# Patient Record
Sex: FEMALE | Race: WHITE | NOT HISPANIC OR LATINO | Employment: FULL TIME | URBAN - METROPOLITAN AREA
[De-identification: names, ages, dates, MRNs, and addresses within clinical notes are randomized per-mention and may not be internally consistent; named-entity substitution may affect disease eponyms.]

---

## 2017-10-20 ENCOUNTER — TRANSCRIBE ORDERS (OUTPATIENT)
Dept: URGENT CARE | Facility: CLINIC | Age: 42
End: 2017-10-20

## 2017-10-20 ENCOUNTER — OFFICE VISIT (OUTPATIENT)
Dept: URGENT CARE | Facility: CLINIC | Age: 42
End: 2017-10-20
Payer: COMMERCIAL

## 2017-10-20 ENCOUNTER — APPOINTMENT (OUTPATIENT)
Dept: RADIOLOGY | Facility: CLINIC | Age: 42
End: 2017-10-20
Payer: COMMERCIAL

## 2017-10-20 DIAGNOSIS — M79.641 PAIN OF RIGHT HAND: ICD-10-CM

## 2017-10-20 PROCEDURE — 73130 X-RAY EXAM OF HAND: CPT

## 2017-10-20 PROCEDURE — 99203 OFFICE O/P NEW LOW 30 MIN: CPT

## 2017-10-24 NOTE — PROGRESS NOTES
Assessment  1  Viral illness (079 99) (B34 9)   2  Hand pain, right (749 5) (B04 209)    Discussion/Summary  Discussion Summary:   1) supportive care for cold sxsotc meds as needed for sxs control  Medication Side Effects Reviewed: Possible side effects of new medications were reviewed with the patient/guardian today  Understands and agrees with treatment plan: The treatment plan was reviewed with the patient/guardian  The patient/guardian understands and agrees with the treatment plan   Counseling Documentation With Imm: The patient, patient's family was counseled regarding instructions for management,-- patient and family education,-- importance of compliance with treatment  Chief Complaint  1  Ear Pain  Chief Complaint Free Text Note Form: Earache, sore throat and achy since Tuesday      History of Present Illness  HPI: 37yo F p/w earache, sore throat, and body aches x 4 days and pain in R 4th MCP joint x 2 weeks  Pt states she has trigger finger in that hand and has been unable to find MCP joint x 2 weeks  Pt denies fever/chills, sob/wheezing/cough, n/v/d  Hospital Based Practices Required Assessment:   Pain Assessment   the patient states they have pain  The pain is located in the ear,throat  The patient describes the pain as sharp and aching  (on a scale of 0 to 10, the patient rates the pain at 5 )   Abuse And Domestic Violence Screen    Yes, the patient is safe at home  -- The patient states no one is hurting them  Depression And Suicide Screen  No, the patient has not had thoughts of hurting themself  No, the patient has not felt depressed in the past 7 days  Review of Systems  Focused-Female:   Constitutional: No fever, no chills, feels well, no tiredness, no recent weight gain or loss  ENT: earache,-- sore throat-- and-- nasal discharge, but-- no nosebleeds-- and-- no hearing loss     Cardiovascular: no complaints of slow or fast heart rate, no chest pain, no palpitations, no leg claudication or lower extremity edema  Respiratory: no complaints of shortness of breath, no wheezing, no dyspnea on exertion, no orthopnea or PND,-- no shortness of breath,-- no cough,-- no wheezing-- and-- no shortness of breath during exertion  Breasts: no complaints of breast pain, breast lump or nipple discharge  Gastrointestinal: no complaints of abdominal pain, no constipation, no nausea or diarrhea, no vomiting, no bloody stools  Genitourinary: no complaints of dysuria, no incontinence, no pelvic pain, no dysmenorrhea, no vaginal discharge or abnormal vaginal bleeding  Musculoskeletal: arthralgias,-- myalgias-- and-- joint stiffness, but-- no joint swelling,-- no limb pain-- and-- no limb swelling  Integumentary: no complaints of skin rash or lesion, no itching or dry skin, no skin wounds  Neurological: no complaints of headache, no confusion, no numbness or tingling, no dizziness or fainting  ROS Reviewed:   ROS reviewed  Active Problems  1  Fibromyalgia (729 1) (M79 7)    Past Medical History  Active Problems And Past Medical History Reviewed: The active problems and past medical history were reviewed and updated today  Family History  Family History Reviewed: The family history was reviewed and updated today  Social History  Social History Reviewed: The social history was reviewed and is unchanged  Surgical History  Surgical History Reviewed: The surgical history was reviewed and updated today  Current Meds   1  No Reported Medications Recorded  Medication List Reviewed: The medication list was reviewed and updated today  Allergies  1   Penicillins    Vitals  Signs   Recorded: [id number] 05:26PM   Temperature: 99 2 F  Heart Rate: 60  Respiration: 18  Systolic: 534  Diastolic: 91  Height: 5 ft 5 in  Weight: 179 lb   BMI Calculated: 29 79  BSA Calculated: 1 89  O2 Saturation: 96    Physical Exam    Constitutional   General appearance: No acute distress, well appearing and well nourished  Ears, Nose, Mouth, and Throat   Otoscopic examination: Tympanic membranes translucent with normal light reflex  Canals patent without erythema  Nasal mucosa, septum, and turbinates: Abnormal   normal nasal septum,-- no intranasal masses or polyps-- and-- normal nasal turbinates  There was clear rhinorrhea from both nares  The bilateral nasal mucosa was edematous  Oropharynx: Abnormal   The posterior pharynx was erythematous  Oral mucosa was moist, but-- was normal  The palate examination showed no abnormalities  The tongue was normal  There was enlargement and erythema of both tonsils  Pulmonary   Respiratory effort: No increased work of breathing or signs of respiratory distress  Auscultation of lungs: Clear to auscultation  no rales or crackles were heard bilaterally  no rhonchi  no friction rub  no wheezing  no diminished breath sounds  Cardiovascular   Palpation of heart: Normal PMI, no thrills  Auscultation of heart: Normal rate and rhythm, normal S1 and S2, without murmurs  Abdomen   Abdomen: Non-tender, no masses  Liver and spleen: No hepatomegaly or splenomegaly  Lymphatic   Palpation of lymph nodes in neck: No lymphadenopathy  Musculoskeletal   Gait and station: Normal     Digits and nails: Normal without clubbing or cyanosis  Inspection/palpation of joints, bones, and muscles: Abnormal  -- TTP, crepitus, and laxity over R 4th MCP        Signatures   Electronically signed by : RICARDO Joe; Oct 20 2017  5:46PM EST                       (Author)    Electronically signed by : TENZIN Fields ; Oct 23 2017  4:58PM EST                       (Co-author)

## 2022-06-20 ENCOUNTER — APPOINTMENT (EMERGENCY)
Dept: RADIOLOGY | Facility: HOSPITAL | Age: 47
End: 2022-06-20
Payer: COMMERCIAL

## 2022-06-20 ENCOUNTER — HOSPITAL ENCOUNTER (EMERGENCY)
Facility: HOSPITAL | Age: 47
Discharge: HOME | End: 2022-06-20
Attending: EMERGENCY MEDICINE
Payer: COMMERCIAL

## 2022-06-20 VITALS
HEART RATE: 79 BPM | OXYGEN SATURATION: 98 % | DIASTOLIC BLOOD PRESSURE: 73 MMHG | WEIGHT: 214.73 LBS | TEMPERATURE: 98.7 F | RESPIRATION RATE: 16 BRPM | SYSTOLIC BLOOD PRESSURE: 111 MMHG

## 2022-06-20 DIAGNOSIS — R07.9 CHEST PAIN: Primary | ICD-10-CM

## 2022-06-20 DIAGNOSIS — M62.838 MUSCLE SPASM: ICD-10-CM

## 2022-06-20 LAB
2HR DELTA HS TROPONIN: 0 NG/L
ANION GAP SERPL CALCULATED.3IONS-SCNC: 7 MMOL/L (ref 4–13)
ATRIAL RATE: 86 BPM
ATRIAL RATE: 87 BPM
BASOPHILS # BLD AUTO: 0.04 THOUSANDS/ΜL (ref 0–0.1)
BASOPHILS NFR BLD AUTO: 1 % (ref 0–1)
BUN SERPL-MCNC: 10 MG/DL (ref 5–25)
CALCIUM SERPL-MCNC: 9 MG/DL (ref 8.3–10.1)
CARDIAC TROPONIN I PNL SERPL HS: 6 NG/L
CARDIAC TROPONIN I PNL SERPL HS: 6 NG/L
CHLORIDE SERPL-SCNC: 102 MMOL/L (ref 100–108)
CK MB SERPL-MCNC: 4 NG/ML (ref 0–5)
CK MB SERPL-MCNC: <1 % (ref 0–2.5)
CK SERPL-CCNC: 515 U/L (ref 26–192)
CO2 SERPL-SCNC: 29 MMOL/L (ref 21–32)
CREAT SERPL-MCNC: 0.95 MG/DL (ref 0.6–1.3)
EOSINOPHIL # BLD AUTO: 0.41 THOUSAND/ΜL (ref 0–0.61)
EOSINOPHIL NFR BLD AUTO: 5 % (ref 0–6)
ERYTHROCYTE [DISTWIDTH] IN BLOOD BY AUTOMATED COUNT: 12.6 % (ref 11.6–15.1)
GFR SERPL CREATININE-BSD FRML MDRD: 71 ML/MIN/1.73SQ M
GLUCOSE SERPL-MCNC: 106 MG/DL (ref 65–140)
HCT VFR BLD AUTO: 44.1 % (ref 34.8–46.1)
HGB BLD-MCNC: 14.5 G/DL (ref 11.5–15.4)
IMM GRANULOCYTES # BLD AUTO: 0.02 THOUSAND/UL (ref 0–0.2)
IMM GRANULOCYTES NFR BLD AUTO: 0 % (ref 0–2)
LYMPHOCYTES # BLD AUTO: 2.66 THOUSANDS/ΜL (ref 0.6–4.47)
LYMPHOCYTES NFR BLD AUTO: 34 % (ref 14–44)
MCH RBC QN AUTO: 30.7 PG (ref 26.8–34.3)
MCHC RBC AUTO-ENTMCNC: 32.9 G/DL (ref 31.4–37.4)
MCV RBC AUTO: 93 FL (ref 82–98)
MONOCYTES # BLD AUTO: 0.68 THOUSAND/ΜL (ref 0.17–1.22)
MONOCYTES NFR BLD AUTO: 9 % (ref 4–12)
NEUTROPHILS # BLD AUTO: 3.93 THOUSANDS/ΜL (ref 1.85–7.62)
NEUTS SEG NFR BLD AUTO: 51 % (ref 43–75)
NRBC BLD AUTO-RTO: 0 /100 WBCS
P AXIS: 75 DEGREES
P AXIS: 76 DEGREES
PLATELET # BLD AUTO: 318 THOUSANDS/UL (ref 149–390)
PMV BLD AUTO: 9.5 FL (ref 8.9–12.7)
POTASSIUM SERPL-SCNC: 3.8 MMOL/L (ref 3.5–5.3)
PR INTERVAL: 142 MS
PR INTERVAL: 144 MS
QRS AXIS: 61 DEGREES
QRS AXIS: 75 DEGREES
QRSD INTERVAL: 94 MS
QRSD INTERVAL: 94 MS
QT INTERVAL: 376 MS
QT INTERVAL: 384 MS
QTC INTERVAL: 449 MS
QTC INTERVAL: 462 MS
RBC # BLD AUTO: 4.72 MILLION/UL (ref 3.81–5.12)
SODIUM SERPL-SCNC: 138 MMOL/L (ref 136–145)
T WAVE AXIS: 19 DEGREES
T WAVE AXIS: 46 DEGREES
VENTRICULAR RATE: 86 BPM
VENTRICULAR RATE: 87 BPM
WBC # BLD AUTO: 7.74 THOUSAND/UL (ref 4.31–10.16)

## 2022-06-20 PROCEDURE — 80048 BASIC METABOLIC PNL TOTAL CA: CPT | Performed by: EMERGENCY MEDICINE

## 2022-06-20 PROCEDURE — 82550 ASSAY OF CK (CPK): CPT | Performed by: EMERGENCY MEDICINE

## 2022-06-20 PROCEDURE — 99284 EMERGENCY DEPT VISIT MOD MDM: CPT | Performed by: EMERGENCY MEDICINE

## 2022-06-20 PROCEDURE — 99285 EMERGENCY DEPT VISIT HI MDM: CPT

## 2022-06-20 PROCEDURE — 93005 ELECTROCARDIOGRAM TRACING: CPT

## 2022-06-20 PROCEDURE — 96374 THER/PROPH/DIAG INJ IV PUSH: CPT

## 2022-06-20 PROCEDURE — 82553 CREATINE MB FRACTION: CPT | Performed by: EMERGENCY MEDICINE

## 2022-06-20 PROCEDURE — 85025 COMPLETE CBC W/AUTO DIFF WBC: CPT | Performed by: EMERGENCY MEDICINE

## 2022-06-20 PROCEDURE — 84484 ASSAY OF TROPONIN QUANT: CPT | Performed by: EMERGENCY MEDICINE

## 2022-06-20 PROCEDURE — 36415 COLL VENOUS BLD VENIPUNCTURE: CPT | Performed by: EMERGENCY MEDICINE

## 2022-06-20 PROCEDURE — 71045 X-RAY EXAM CHEST 1 VIEW: CPT

## 2022-06-20 PROCEDURE — 93010 ELECTROCARDIOGRAM REPORT: CPT | Performed by: INTERNAL MEDICINE

## 2022-06-20 RX ORDER — ACETAMINOPHEN 325 MG/1
975 TABLET ORAL ONCE
Status: COMPLETED | OUTPATIENT
Start: 2022-06-20 | End: 2022-06-20

## 2022-06-20 RX ORDER — LIDOCAINE 50 MG/G
1 PATCH TOPICAL ONCE
Status: DISCONTINUED | OUTPATIENT
Start: 2022-06-20 | End: 2022-06-21 | Stop reason: HOSPADM

## 2022-06-20 RX ORDER — METHOCARBAMOL 750 MG/1
750 TABLET, FILM COATED ORAL ONCE
Status: COMPLETED | OUTPATIENT
Start: 2022-06-20 | End: 2022-06-20

## 2022-06-20 RX ORDER — METHOCARBAMOL 500 MG/1
500 TABLET, FILM COATED ORAL 2 TIMES DAILY
Qty: 28 TABLET | Refills: 0 | Status: SHIPPED | OUTPATIENT
Start: 2022-06-20 | End: 2022-07-04

## 2022-06-20 RX ORDER — DIPHENOXYLATE HYDROCHLORIDE AND ATROPINE SULFATE 2.5; .025 MG/1; MG/1
1 TABLET ORAL
COMMUNITY

## 2022-06-20 RX ORDER — SODIUM CHLORIDE 9 MG/ML
3 INJECTION INTRAVENOUS
Status: DISCONTINUED | OUTPATIENT
Start: 2022-06-20 | End: 2022-06-21 | Stop reason: HOSPADM

## 2022-06-20 RX ORDER — NAPROXEN SODIUM 220 MG
220 TABLET ORAL
COMMUNITY

## 2022-06-20 RX ORDER — KETOROLAC TROMETHAMINE 30 MG/ML
15 INJECTION, SOLUTION INTRAMUSCULAR; INTRAVENOUS ONCE
Status: COMPLETED | OUTPATIENT
Start: 2022-06-20 | End: 2022-06-20

## 2022-06-20 RX ADMIN — ACETAMINOPHEN 325MG 975 MG: 325 TABLET ORAL at 19:33

## 2022-06-20 RX ADMIN — KETOROLAC TROMETHAMINE 15 MG: 30 INJECTION, SOLUTION INTRAMUSCULAR; INTRAVENOUS at 19:33

## 2022-06-20 RX ADMIN — LIDOCAINE 1 PATCH: 50 PATCH TOPICAL at 22:20

## 2022-06-20 RX ADMIN — METHOCARBAMOL 750 MG: 750 TABLET ORAL at 20:15

## 2022-06-20 NOTE — ED ATTENDING ATTESTATION
6/20/2022  Dedra PERAZA, saw and evaluated the patient  I have discussed the patient with the resident/non-physician practitioner and agree with the resident's/non-physician practitioner's findings, Plan of Care, and MDM as documented in the resident's/non-physician practitioner's note, except where noted  All available labs and Radiology studies were reviewed  I was present for key portions of any procedure(s) performed by the resident/non-physician practitioner and I was immediately available to provide assistance  At this point I agree with the current assessment done in the Emergency Department  I have conducted an independent evaluation of this patient a history and physical is as follows: A 51 yo female with no significant pmhx; presents with midsternal chest pain that began an hour prior to arrival   Patient states she was at work when she dropped a bunch of laundry, leading her to crawl into the laundry cart  Patient states during this she developed midsternal chest pain and shortness of breath  Patient states symptoms have improved since then although still present  Patient describes it as a cramping sensation  Patient also reports having left-sided chest chest pain for the past week, beginning after her daughter fell on top of her  Patient otherwise denies fever, chills, abdominal pain, nausea, vomiting, diarrhea, peripheral edema and rashes  Patient does report having recurrent cramps and myalgias over the past year, occurring throughout her body and believes this feels similar  Patient's PCP diagnosed her with fibromyalgia  Physical Exam  General Appearance: alert and oriented, nad, non toxic appearing  Skin:  Warm, dry, intact  HEENT: atraumatic, normocephalic  Neck: Supple, trachea midline  Cardiac: RRR; no murmurs, rub, gallops  Pulmonary: lungs CTAB; no wheezes, rales, rhonchi  Lower sternum tender to palpation    Left lateral chest wall tender to palpation  Gastrointestinal: abdomen soft, nontender, nondistended; no guarding or rebound tenderness; good bowel sounds, no mass or bruits  Extremities:  no pedal edema, 2+ pulses; no calf tenderness, no clubbing, no cyanosis  Neuro:  no focal motor or sensory deficits, CN 2-12 grossly intact  Psych:  Normal mood and affect, normal judgement and insight    Assessment and Plan:  Chest pain, atypical for ACS  Suspect more muscular in nature as symptoms are reproducible  Patient is hypertensive on arrival, will check labs for cardiac etiology, end-organ damage and metabolic derangements  Will treat symptomatically      ED Course         Critical Care Time  Procedures

## 2022-06-20 NOTE — Clinical Note
Mariana Ortiz was seen and treated in our emergency department on 6/20/2022  Diagnosis:     Lyndall Boast    She may return on this date: 06/22/2022         If you have any questions or concerns, please don't hesitate to call        Ludwig Villar MD    ______________________________           _______________          _______________  Hospital Representative                              Date                                Time

## 2022-06-20 NOTE — ED PROVIDER NOTES
History  Chief Complaint   Patient presents with    Abdominal Pain     Pt reports midepigastric pain and has been using topically muscle relaxer with no relief  Pt states "feels like a silvino horse in my chest"    Flank Pain     Pt reports flank pain for the past week     Kalyan Lawler is a 52year-old woman with a medical history significant for muscle spasms over the last few months presenting with chest pain  It started when she was crawling into a laundry cart to empty it at work  She works at a [employer] on the [employer] unit  The chest pain started about 1 hour prior to presentation  Describes it as a cramp  It radiate to her left scapula  Sitting up and walking around improves it  Laying down worsens it  The nurse at the medical unit at the [employer] evaluated her, stated that she syncopized, however the patient does not believe she syncopized  The pain is now improving  She had some shortness of breath and dizziness, which is also improving  She also reports left chest wall pain that started about 1 week ago when her 200 lb daughter fell on top of her  She had one episode of diarrhea yesterday  She was recently on a trip to [facility]  She had a cancerous poly removed during a recent colonscopy, however has never received chemo or radiation and had a subsequent colonoscopy which was "clear " She is vaccinated against COVID and the flu  She reports social alcohol use, no tobacco or recreational drug use  No hemoptysis, cough, congestion, rhinorrhea, fevers, chills, vomiting, abdominal pain, history of blood clot, recent surgeries or hospital stays, lower extremity edema or pain, cardiac history  Prior to Admission Medications   Prescriptions Last Dose Informant Patient Reported? Taking?    cyanocobalamin (VITAMIN B-12) 100 MCG tablet   Yes No   Sig: Take 100 mcg by mouth   multivitamin (THERAGRAN) TABS   Yes No   Sig: Take 1 tablet by mouth   naproxen sodium (ALEVE) 220 MG tablet   Yes No Sig: Take 220 mg by mouth      Facility-Administered Medications: None       History reviewed  No pertinent past medical history  History reviewed  No pertinent surgical history  History reviewed  No pertinent family history  I have reviewed and agree with the history as documented  E-Cigarette/Vaping     E-Cigarette/Vaping Substances    Nicotine No     THC No     CBD No     Flavoring No     Other No      Social History     Tobacco Use    Smoking status: Never Smoker    Smokeless tobacco: Never Used   Substance Use Topics    Alcohol use: Yes     Comment: socially    Drug use: Never        Review of Systems   All other systems reviewed and are negative  Physical Exam  ED Triage Vitals   Temperature Pulse Respirations Blood Pressure SpO2   06/20/22 1852 06/20/22 1851 06/20/22 1851 06/20/22 1851 06/20/22 1851   98 7 °F (37 1 °C) 92 19 (!) 226/106 98 %      Temp Source Heart Rate Source Patient Position - Orthostatic VS BP Location FiO2 (%)   06/20/22 1852 06/20/22 1851 06/20/22 1851 06/20/22 1851 --   Oral Monitor Lying Right arm       Pain Score       06/20/22 1933       7             Orthostatic Vital Signs  Vitals:    06/20/22 2013 06/20/22 2045 06/20/22 2115 06/20/22 2212   BP: (!) 177/80 162/89 127/70 111/73   Pulse: 85 90 90 79   Patient Position - Orthostatic VS: Lying Lying Lying Lying       Physical Exam  Vitals and nursing note reviewed  Constitutional:       General: She is not in acute distress  Appearance: She is not ill-appearing  HENT:      Head: Normocephalic and atraumatic  Right Ear: External ear normal       Left Ear: External ear normal       Nose: Nose normal    Eyes:      Conjunctiva/sclera: Conjunctivae normal    Cardiovascular:      Rate and Rhythm: Normal rate and regular rhythm  Heart sounds: Normal heart sounds  Pulmonary:      Effort: Pulmonary effort is normal       Breath sounds: Normal breath sounds     Chest:      Comments: Chest pain not reproducible on exam    Abdominal:      General: There is no distension  Palpations: Abdomen is soft  Tenderness: There is no abdominal tenderness  Musculoskeletal:         General: No deformity  Skin:     General: Skin is warm and dry  Neurological:      General: No focal deficit present  Mental Status: She is alert and oriented to person, place, and time           ED Medications  Medications   acetaminophen (TYLENOL) tablet 975 mg (975 mg Oral Given 6/20/22 1933)   ketorolac (TORADOL) injection 15 mg (15 mg Intravenous Given 6/20/22 1933)   methocarbamol (ROBAXIN) tablet 750 mg (750 mg Oral Given 6/20/22 2015)       Diagnostic Studies  Results Reviewed     Procedure Component Value Units Date/Time    HS Troponin I 2hr [[id number]]  (Normal) Collected: 06/20/22 2114    Lab Status: Final result Specimen: Blood from Arm, Left Updated: 06/20/22 2154     hs TnI 2hr 6 ng/L      Delta 2hr hsTnI 0 ng/L     CKMB [[id number]]  (Normal) Collected: 06/20/22 1914    Lab Status: Final result Specimen: Blood from Arm, Left Updated: 06/20/22 2118     CK-MB Index <1 0 %      CK-MB 4 0 ng/mL     CK Total with Reflex CKMB [[id number]]  (Abnormal) Collected: 06/20/22 1914    Lab Status: Final result Specimen: Blood from Arm, Left Updated: 06/20/22 2117     Total  U/L     HS Troponin 0hr (reflex protocol) [[id number]]  (Normal) Collected: 06/20/22 1914    Lab Status: Final result Specimen: Blood from Arm, Left Updated: 06/20/22 1944     hs TnI 0hr 6 ng/L     Basic metabolic panel [[id number]] Collected: 06/20/22 1914    Lab Status: Final result Specimen: Blood from Arm, Left Updated: 06/20/22 1932     Sodium 138 mmol/L      Potassium 3 8 mmol/L      Chloride 102 mmol/L      CO2 29 mmol/L      ANION GAP 7 mmol/L      BUN 10 mg/dL      Creatinine 0 95 mg/dL      Glucose 106 mg/dL      Calcium 9 0 mg/dL      eGFR 71 ml/min/1 73sq m     Narrative:      [place] guidelines for Chronic Kidney Disease (CKD):     Stage 1 with normal or high GFR (GFR > 90 mL/min/1 73 square meters)    Stage 2 Mild CKD (GFR = 60-89 mL/min/1 73 square meters)    Stage 3A Moderate CKD (GFR = 45-59 mL/min/1 73 square meters)    Stage 3B Moderate CKD (GFR = 30-44 mL/min/1 73 square meters)    Stage 4 Severe CKD (GFR = 15-29 mL/min/1 73 square meters)    Stage 5 End Stage CKD (GFR <15 mL/min/1 73 square meters)  Note: GFR calculation is accurate only with a steady state creatinine    CBC and differential [[id number]] Collected: 06/20/22 1914    Lab Status: Final result Specimen: Blood from Arm, Left Updated: 06/20/22 1919     WBC 7 74 Thousand/uL      RBC 4 72 Million/uL      Hemoglobin 14 5 g/dL      Hematocrit 44 1 %      MCV 93 fL      MCH 30 7 pg      MCHC 32 9 g/dL      RDW 12 6 %      MPV 9 5 fL      Platelets 728 Thousands/uL      nRBC 0 /100 WBCs      Neutrophils Relative 51 %      Immat GRANS % 0 %      Lymphocytes Relative 34 %      Monocytes Relative 9 %      Eosinophils Relative 5 %      Basophils Relative 1 %      Neutrophils Absolute 3 93 Thousands/µL      Immature Grans Absolute 0 02 Thousand/uL      Lymphocytes Absolute 2 66 Thousands/µL      Monocytes Absolute 0 68 Thousand/µL      Eosinophils Absolute 0 41 Thousand/µL      Basophils Absolute 0 04 Thousands/µL                  X-ray chest 1 view portable   ED Interpretation by Tricia Tirado MD (06/20 2035)   No acute cardiopulmonary disease  No rib fractures seen, however difficult to assess due to body habitus  Final Result by Jinny Allen MD (06/21 8974)      No acute cardiopulmonary disease  This report is in agreement with the preliminary interpretation  Workstation performed: [id number]               Procedures  Procedures      ED Course  ED Course as of 06/21/22 1708   Mon Jun 20, 2022   1946 hs TnI 0hr: 6  Will continue to trend   2017 Procedure Note: EKG  Date/Time: 06/20/22 8:17 PM   Interpreted by:  Leonardo Lopes Justice  Indications / Diagnosis: CP  ECG reviewed by me, the ED Provider: yes   The EKG demonstrates:  Rhythm: normal sinus  Intervals: normal intervals  Axis: normal axis  QRS/Blocks: normal QRS  ST Changes: No acute ST Changes, no STD/SALMA        2018 Reassessed after Tylenol and ibuprofen, pain persistent, given Robaxin  2111 Reassessed, pain improved  2211 Delta 2hr hsTnI: 0             HEART Risk Score    Flowsheet Row Most Recent Value   Heart Score Risk Calculator    History 0 Filed at: 06/20/2022 2037   ECG 0 Filed at: 06/20/2022 2037   Age 1 Filed at: 06/20/2022 2037   Risk Factors 1 Filed at: 06/20/2022 2037   Troponin 0 Filed at: 06/20/2022 2037   HEART Score 2 Filed at: 06/20/2022 2037                      SBIRT 20yo+    Flowsheet Row Most Recent Value   SBIRT (23 yo +)    In order to provide better care to our patients, we are screening all of our patients for alcohol and drug use  Would it be okay to ask you these screening questions? No Filed at: 06/20/2022 2011                [facility]  Number of Diagnoses or Management Options  Chest pain  Muscle spasm  Diagnosis management comments: 51 yo woman presenting with chest cramping, concerning for ACS vs muscle spasms  Muscle spasms concerning for hypokalemia  Will evaluate with CBC, BMP, troponin, ECG, CXR  Symptom management in ED with Robaxin, Tylenol, and Toradol  Work up unremarkable  Lidocaine patch given for additional pain control  Discharged home in stable condition, given prescription for Robaxin, to follow up with PCP, return precautions given         Disposition  Final diagnoses:   Chest pain   Muscle spasm     Time reflects when diagnosis was documented in both MDM as applicable and the Disposition within this note     Time User Action Codes Description Comment    6/20/2022 10:12 PM Kadi Desouza Add [R07 9] Chest pain     6/20/2022 10:12 PM Kadi Desouza Add [Y97 977] Muscle spasm       ED Disposition     ED Disposition   Discharge    Condition Stable    Date/Time   Mon Jun 20, 2022 10:12 PM    Comment   Melanie Cronin discharge to home/self care  Follow-up Information     Follow up With Specialties Details Why Contact Info Additional [address] HURTADO MD [facility] Medicine   [address]  [place] [address] [facility] [address] Emergency Medicine  If symptoms worsen [facility] [id number]  [address] [facility] Emergency Department, [address], [place], [address]          Discharge Medication List as of 6/20/2022 10:19 PM      START taking these medications    Details   Diclofenac Sodium (VOLTAREN) 1 % Apply 2 g topically 4 (four) times a day, Starting Mon 6/20/2022, Normal      methocarbamol (ROBAXIN) 500 mg tablet Take 1 tablet (500 mg total) by mouth 2 (two) times a day for 14 days, Starting Mon 6/20/2022, Until Mon 7/4/2022, Normal         CONTINUE these medications which have NOT CHANGED    Details   cyanocobalamin (VITAMIN B-12) 100 MCG tablet Take 100 mcg by mouth, Historical Med      multivitamin (THERAGRAN) TABS Take 1 tablet by mouth, Historical Med      naproxen sodium (ALEVE) 220 MG tablet Take 220 mg by mouth, Historical Med           No discharge procedures on file  PDMP Review     None           ED Provider  Attending physically available and evaluated Melanie Cronin  I managed the patient along with the ED Attending      Electronically Signed by         Dayne Frances MD  06/21/22 6192

## 2022-06-21 LAB
ATRIAL RATE: 79 BPM
P AXIS: 64 DEGREES
PR INTERVAL: 150 MS
QRS AXIS: 53 DEGREES
QRSD INTERVAL: 100 MS
QT INTERVAL: 348 MS
QTC INTERVAL: 399 MS
T WAVE AXIS: 16 DEGREES
VENTRICULAR RATE: 79 BPM

## 2022-06-21 PROCEDURE — 93010 ELECTROCARDIOGRAM REPORT: CPT | Performed by: INTERNAL MEDICINE

## 2022-06-21 NOTE — DISCHARGE INSTRUCTIONS
Use Voltaren cream, Robaxin, Tylenol, and ibuprofen to treat your muscle spasms  You should not drive or operate machinery for 6 hours after taking the Robaxin as this medication can make you drowsy  Please follow up with your primary care doctor  If you have any other new or worsening symptoms, please return to your nearest ER